# Patient Record
Sex: FEMALE | Race: WHITE | NOT HISPANIC OR LATINO | URBAN - METROPOLITAN AREA
[De-identification: names, ages, dates, MRNs, and addresses within clinical notes are randomized per-mention and may not be internally consistent; named-entity substitution may affect disease eponyms.]

---

## 2024-02-14 ENCOUNTER — OV NP (OUTPATIENT)
Dept: URBAN - METROPOLITAN AREA CLINIC 25 | Facility: CLINIC | Age: 34
End: 2024-02-14
Payer: COMMERCIAL

## 2024-02-14 VITALS
HEART RATE: 80 BPM | HEIGHT: 67 IN | TEMPERATURE: 97.6 F | WEIGHT: 159.6 LBS | DIASTOLIC BLOOD PRESSURE: 83 MMHG | SYSTOLIC BLOOD PRESSURE: 111 MMHG | BODY MASS INDEX: 25.05 KG/M2

## 2024-02-14 DIAGNOSIS — K92.1 HEMATOCHEZIA: ICD-10-CM

## 2024-02-14 DIAGNOSIS — R19.7 DIARRHEA: ICD-10-CM

## 2024-02-14 DIAGNOSIS — R19.4 CHANGE IN BOWEL HABIT: ICD-10-CM

## 2024-02-14 PROCEDURE — 99204 OFFICE O/P NEW MOD 45 MIN: CPT | Performed by: INTERNAL MEDICINE

## 2024-02-14 RX ORDER — POLYETHYLENE GLYCOL 3350, SODIUM SULFATE, SODIUM CHLORIDE, POTASSIUM CHLORIDE, ASCORBIC ACID, SODIUM ASCORBATE 140-9-5.2G
AS DIRECTED KIT ORAL AS DIRECTED
Qty: 1 BOX | Refills: 0 | OUTPATIENT
Start: 2024-02-14 | End: 2024-02-15

## 2024-02-14 NOTE — HPI-TODAY'S VISIT:
33 year woman presenting with GI complaints.  In December she had some hematochezia.  She had BRB on the tissue and in the stool.  Since then she has had recurrent episodes.  She has regular BM's.  She has had looser stool lately.  She denies increased frequency.  She denies abdominal pain.  SHe denies rectal pain.  She denies anorexia or weight loss.  About 10 years ago she had rectal bleeding.  SHe had a colonoscopy about 10 years ago and she had a fiddure that was treated.  She denies UGI symptoms.  She denies NSAID's.  There is no FHx of colon cancer or IBD

## 2024-02-26 ENCOUNTER — COLON (OUTPATIENT)
Dept: URBAN - METROPOLITAN AREA SURGERY CENTER 20 | Facility: SURGERY CENTER | Age: 34
End: 2024-02-26